# Patient Record
Sex: FEMALE | NOT HISPANIC OR LATINO | URBAN - METROPOLITAN AREA
[De-identification: names, ages, dates, MRNs, and addresses within clinical notes are randomized per-mention and may not be internally consistent; named-entity substitution may affect disease eponyms.]

---

## 2018-01-22 ENCOUNTER — CONVERSION ENCOUNTER (OUTPATIENT)
Dept: OTHER | Facility: HOSPITAL | Age: 68
End: 2018-01-22

## 2018-01-22 ENCOUNTER — OFFICE VISIT CONVERTED (OUTPATIENT)
Dept: CARDIOLOGY | Facility: CLINIC | Age: 68
End: 2018-01-22
Attending: SPECIALIST

## 2018-02-01 ENCOUNTER — OFFICE VISIT CONVERTED (OUTPATIENT)
Dept: PULMONOLOGY | Facility: CLINIC | Age: 68
End: 2018-02-01
Attending: INTERNAL MEDICINE

## 2018-02-13 ENCOUNTER — OFFICE VISIT CONVERTED (OUTPATIENT)
Dept: GASTROENTEROLOGY | Facility: CLINIC | Age: 68
End: 2018-02-13
Attending: NURSE PRACTITIONER

## 2021-05-16 VITALS
WEIGHT: 230 LBS | SYSTOLIC BLOOD PRESSURE: 113 MMHG | HEART RATE: 62 BPM | HEIGHT: 66 IN | BODY MASS INDEX: 36.96 KG/M2 | DIASTOLIC BLOOD PRESSURE: 54 MMHG

## 2021-05-16 VITALS
WEIGHT: 241.25 LBS | DIASTOLIC BLOOD PRESSURE: 40 MMHG | SYSTOLIC BLOOD PRESSURE: 129 MMHG | HEIGHT: 66 IN | BODY MASS INDEX: 38.77 KG/M2

## 2021-05-28 VITALS
OXYGEN SATURATION: 90 % | HEART RATE: 70 BPM | RESPIRATION RATE: 12 BRPM | BODY MASS INDEX: 38.82 KG/M2 | SYSTOLIC BLOOD PRESSURE: 155 MMHG | WEIGHT: 241.56 LBS | HEIGHT: 66 IN | TEMPERATURE: 97.7 F | DIASTOLIC BLOOD PRESSURE: 65 MMHG

## 2021-05-28 NOTE — PROGRESS NOTES
Patient: EPLEY,DENA     Acct: [id number]     Report: #[id number]  UNIT #: [id number]     : 1950    Encounter Date:2018  PRIMARY CARE: RAYMOND URBINA  ***Signed***  --------------------------------------------------------------------------------------------------------------------  Chief Complaint      Encounter Date      2018            Patient Complaint      Patient is complaining of      soa            VITALS      Height 5 ft 6 in / 167.64 cm      Weight 241 lbs 9 oz / 109.137676 kg      BSA 2.31 m2      BMI 39.0 kg/m2      Temperature 97.7 F / 36.5 C - Oral      Pulse 70      Respirations 12      Blood Pressure 155/65 Sitting, Right Arm      Pulse Oximetry 90%, roomair@rest            HPI      The patient is a 67 year old lifetime nonsmoker with a history of leukemia,     breast cancer and COPD, unspecified severity who presents [facility].     She is accompanied by her daughter who is currently her caretaker. The patient     had relocated from [place] and is now living with her daughter and was on     oxygen back in [place] and they come here today so they can get requalified     for oxygen.              The patient complains of chronic coughing, shortness of breath at rest and with     exertion.  She has been tested for sleep apnea, but states she will not ever     wear the CPAP machine secondary to claustrophobia.  She denies fevers, night     sweats or chills.  She ambulates with a rolling walker.            REVIEW OF SYSTEMS:  Updated by myself.            PAST MEDICAL HISTORY:      1.  Breast cancer.      2.  Leukemia.      3.  COPD.      4. Diabetes.      5.  Obesity.      6.  Chronic hypoxemic respiratory failure wearing oxygen at night.              PAST SURGICAL HISTORY:      1.  Cholecystectomy.      2.  Cataract surgery.      3. Carpal tunnel in both hands.      4. Tonsillectomy.      5. Thyroidectomy.      6.  Pacemaker.      7. Three heart stents.                MEDICATIONS:  Reviewed by myself and updated in the chart.            ROS      Constitutional:  Denies: Fatigue, Fever, Weight gain, Weight loss, Chills,     Insomnia, Other      Respiratory/Breathing:  Complains of: Shortness of air, Cough, Denies: Wheezing    , Hemoptysis, Pleuritic pain, Other      Endocrine:  Denies: Polydipsia, Polyuria, Heat/cold intolerance, Abnorml     menstrual pattern, Diabetes, Other      Eyes:  Denies: Blurred vision, Vision Changes, Other      Ears, nose, mouth, throat:  Denies: Mouth lesions, Thrush, Throat pain,     Hoarseness, Allergies/Hay Fever, Post Nasal Drip, Headaches, Recent Head Injury    , Nose Bleeding, Neck Stiffness, Thyroid Mass, Hearing Loss, Ear Fullness, Dry     Mouth, Nasal or Sinus Pain, Dry Lips, Nasal discharge, Nasal congestion, Other      Cardiovascular:  Denies: Palpitations, Syncope, Claudication, Chest Pain, Wake     up Gasping for air, Leg Swelling, Irregular Heart Rate, Cyanosis, Dyspnea on     Exertion, Other      Gastrointestinal:  Denies: Nausea, Constipation, Diarrhea, Abdominal pain,     Vomiting, Difficulty Swallowing, Reflux/Heartburn, Dysphagia, Jaundice, Bloating    , Melena, Bloody stools, Other      Genitourinary:  Denies: Urinary frequency, Incontinence, Hematuria, Urgency,     Nocturia, Dysuria, Testicular problems, Other      Musculoskeletal:  Denies: Joint Pain, Joint Stiffness, Joint Swelling, Myalgias    , Other      Hematologic/lymphatic:  DENIES: Lymphadenopathy, Bruising, Bleeding tendencies,     Other      Neurological:  Denies: Headache, Numbness, Weakness, Seizures, Other      Psychiatric:  Denies: Anxiety, Appropriate Effect, Depression, Other      Sleep:  No: Excessive daytime sleep, Morning Headache?, Snoring, Insomnia?,     Stop breathing at sleep?, Other      Integumentary:  Denies: Rash, Dry skin, Skin Warm to Touch, Other      Immunologic/Allergic:  Denies: Latex allergy, Seasonal allergies, Asthma,     Urticaria, Eczema,  Other      Immunization status:  No: Up to date            FAMILY/SOCIAL/MEDICAL HX      Surgical History:  Yes: Cholecystectomy, Eye Surgery (catertact), Orthopedic     Surgery (carpal tunnel both hands ,graph in right arm ), Throat Surgery (    toncilectomy ), Other Surgeries (thyroid ectomey, amp repair, pace maker and 3     stents), No: Appendectomy      Stroke - Family Hx:  Mother      Heart - Family Hx:  Father, Grandparent      Diabetes - Family Hx:  Aunt, Uncle      Cancer/Type - Family Hx:  Mother (breast,bladder), Father (prostrate),     Grandparent (grandfather bladder)      Is Father Still Living?:  No      Is Mother Still Living?:  No      Social History:  Tobacco Use      Smoking status:  Never smoker      Tubal Ligation:  Yes      Anticoagulation Therapy:  No      Antibiotic Prophylaxis:  Yes      Medical History:  Yes: Chemotherapy/Cancer (leukiemia ,breast ), Chronic     Bronchitis/COPD, Diabetes, Heart Attack            Encouraged to follow-up with:  PCP regarding preventative exams.      Chart initiated by      Lynda Looney ma            ALLERGIES/MEDICATIONS      Allergies:        Coded Allergies:             Clindamycin (Verified  Allergy, Unknown, 2/1/18)       stop breathing            Keflex (Verified  Allergy, Unknown, 2/1/18)      Medications    Last Reconciled on 2/1/18 17:19 by DONALD BARBOZA,       Carbamazepine (Carbamazepine) Unknown Strength Tab.chew      PO BID, #60 TAB.CHEW         Reported         2/1/18       Hydrocodone/Acetaminophen 5/325 MG (Hydrocodone/Acetaminophen 5/325 MG) 1 Each     Tablet      1 TAB PO Q4-6H Y for PAIN, TAB         Reported         2/1/18       Cyclobenzaprine Hcl (Cyclobenzaprine*) Unknown Strength Tablet      PO TID Y for MUSCLE SPASMS, TAB 0 Refills         Reported         2/1/18       Furosemide* (Lasix*) Unknown Strength Tablet      PO QDAY, #30 TAB 0 Refills         Reported         2/1/18       Metoprolol Succinate (Metoprolol Succinate*) Unknown  Strength Tab.er.24h      PO QDAY, #15 TAB.SR.24H         Reported         2/1/18       (kidney vitamin) Unknown Strength  No Conflict Check               Reported         2/1/18       Cholecalciferol (Vitamin D3*) 1,000 Unit Tab      1000 UNITS PO QID, #30 TAB 0 Refills         Prov: DONALD BARBOZA         2/1/18       Insulin Human Regular (NovoLIN R VIAL*) 100 Unit/1 Ml Vial      22 UNITS SUBQ TID INSULIN, #1 VIAL 0 Refills         Reported         2/1/18      Current Medications      Current Medications Reviewed 2/1/18            EXAM      VITAL SIGNS:  Reviewed.  90% on room air at rest.        GENERAL:  Obese female, appears chronically ill in no acute distress.        NECK:  Supple without tracheal deviation or lymphadenopathy.  No thyromegaly     appreciated.      LYMPHATICS:  No cervical or supraclavicular lymphadenopathy.      HEENT: Pupils are equal, round and reactive to light. There is no scleral     icterus.  Nares patent without hypertrophy of the turbinates. No erythema of     the passages.  TMs are clear bilaterally with good cone of light. The posterior     pharynx is without  lesions or erythema.      RESPIRATORY:  Clear to auscultation bilaterally.  No wheezes, rales or rhonchi.      Tympanic to percussion.        CARDIOVASCULAR:  Regular rate and rhythm.  No murmurs, gallops or rubs.  No     lower extremity edema.  Equal radial pulses.        GI: Soft, nontender, nondistended, no organomegaly.  Bowel sounds present in     all four quadrants.      MUSCULOSKELETAL:  No joint effusions, erythema or warmth over the major joint     systems.      SKIN:  No rashes or lesions.      NEUROLOGIC: Cranial nerves II-XII are intact bilaterally.  Moves all     extremities. Ambulates with ease.      PSYCH:  Appropriate mood and affect.      Vtials      Vitals:             Height 5 ft 6 in / 167.64 cm           Weight 241 lbs 9 oz / 109.564824 kg           BSA 2.31 m2           BMI 39.0 kg/m2           Temperature  97.7 F / 36.5 C - Oral           Pulse 70           Respirations 12           Blood Pressure 155/65 Sitting, Right Arm           Pulse Oximetry 90%, roomair@rest            REVIEW      Results Reviewed      P[facility]S Results Reviewed?:  Yes Prev Lab Results, Yes Prev Radiology Results, Yes     [facility]      Lab Results      Unfortunately there was no imaging, echoes or PFTs for my review today.            PLAN      Assessment      Dyspnea - R06.00            Notes      New Medications      * Insulin Human Regular (NovoLIN R VIAL*) 100 UNIT/1 ML VIAL: 22 UNITS SUBQ TID     INSULIN #1      * Cholecalciferol (Vitamin D3*) 1,000 UNIT TAB: 1,000 UNITS PO QID #30      * (kidney vitamin) Unknown Strength:       * Metoprolol Succinate (Metoprolol Succinate*) Unknown Strength TAB.ER.24H: PO     QDAY #15      * Furosemide* (Lasix*) Unknown Strength TABLET: PO QDAY #30      * CYCLOBENZAPRINE HCL (Cyclobenzaprine*) Unknown Strength TABLET: PO TID PRN     MUSCLE SPASMS      * Hydrocodone/Acetaminophen 5/325 MG 1 EACH TABLET: 1 TAB PO Q4-6H PRN PAIN      * Carbamazepine Unknown Strength TAB.CHEW: PO BID #60      New Diagnostics      * PFT-Comp, PrePost,DLCO,BodyBox, Week       Dx: Dyspnea - R06.00      * 6 Min Walk With Pulse Ox, Routine       Dx: Dyspnea - R06.00      * 6 Min Walk With Pulse Ox, Routine       Dx: Dyspnea - R06.00      ASSESSMENT:   The patient is a 67 year old obese female lifetime nonsmoker with     a history of hypertension and diabetes who presents for evaluation of chronic     hypoxemic respiratory failure and dyspnea.        1.  History of chronic hypoxemic respiratory failure requiring oxygen.      2.  Dyspnea at rest and with exertion.      3.  Obesity, BMI 39 kg/m2.      4.  History of MI, status post PCI x3 and a pacemaker.            PLAN:   I have ordered full PFTs and a six minute walk test.  We will see the     patient back after these tests.                 Disclaimer: Converted document  may not contain table formatting or lab diagrams. Please see "[employer]" System for the authenticated document.